# Patient Record
Sex: FEMALE | Race: WHITE | NOT HISPANIC OR LATINO | Employment: FULL TIME | URBAN - METROPOLITAN AREA
[De-identification: names, ages, dates, MRNs, and addresses within clinical notes are randomized per-mention and may not be internally consistent; named-entity substitution may affect disease eponyms.]

---

## 2017-07-24 RX ORDER — ESTRADIOL 1 MG/1
TABLET ORAL
Qty: 30 TABLET | Refills: 0 | Status: SHIPPED | OUTPATIENT
Start: 2017-07-24 | End: 2017-08-28 | Stop reason: SDUPTHER

## 2017-08-23 ENCOUNTER — TRANSCRIBE ORDERS (OUTPATIENT)
Dept: ADMINISTRATIVE | Facility: HOSPITAL | Age: 52
End: 2017-08-23

## 2017-08-23 DIAGNOSIS — Z12.31 VISIT FOR SCREENING MAMMOGRAM: Primary | ICD-10-CM

## 2017-08-25 ENCOUNTER — HOSPITAL ENCOUNTER (OUTPATIENT)
Dept: MAMMOGRAPHY | Facility: HOSPITAL | Age: 52
Discharge: HOME | End: 2017-08-25
Attending: OBSTETRICS & GYNECOLOGY | Admitting: OBSTETRICS & GYNECOLOGY

## 2017-08-25 DIAGNOSIS — Z12.31 VISIT FOR SCREENING MAMMOGRAM: ICD-10-CM

## 2017-08-25 PROCEDURE — 77067 SCR MAMMO BI INCL CAD: CPT | Performed by: RADIOLOGY

## 2017-08-25 PROCEDURE — G0202 SCR MAMMO BI INCL CAD: HCPCS

## 2017-08-25 PROCEDURE — 77063 BREAST TOMOSYNTHESIS BI: CPT

## 2017-08-25 PROCEDURE — 77063 BREAST TOMOSYNTHESIS BI: CPT | Performed by: RADIOLOGY

## 2017-08-28 RX ORDER — ESTRADIOL 1 MG/1
TABLET ORAL
Qty: 30 TABLET | Refills: 0 | Status: SHIPPED | OUTPATIENT
Start: 2017-08-28 | End: 2017-09-20 | Stop reason: SDUPTHER

## 2017-09-20 ENCOUNTER — OFFICE VISIT (OUTPATIENT)
Dept: OBSTETRICS AND GYNECOLOGY | Facility: CLINIC | Age: 52
End: 2017-09-20

## 2017-09-20 VITALS
SYSTOLIC BLOOD PRESSURE: 126 MMHG | HEIGHT: 60 IN | BODY MASS INDEX: 31.02 KG/M2 | RESPIRATION RATE: 14 BRPM | WEIGHT: 158 LBS | DIASTOLIC BLOOD PRESSURE: 76 MMHG

## 2017-09-20 DIAGNOSIS — Z01.419 WELL WOMAN EXAM WITH ROUTINE GYNECOLOGICAL EXAM: Primary | ICD-10-CM

## 2017-09-20 PROCEDURE — 99396 PREV VISIT EST AGE 40-64: CPT | Performed by: OBSTETRICS & GYNECOLOGY

## 2017-09-20 RX ORDER — ESTRADIOL 2 MG/1
2 TABLET ORAL DAILY
Qty: 30 TABLET | Refills: 12 | Status: SHIPPED | OUTPATIENT
Start: 2017-09-20 | End: 2018-09-22 | Stop reason: SDUPTHER

## 2017-09-20 RX ORDER — LISINOPRIL 10 MG/1
TABLET ORAL
COMMUNITY
Start: 2017-08-28 | End: 2021-01-12

## 2017-09-20 NOTE — PROGRESS NOTES
"Subjective   Chief Complaint   Patient presents with   • Gynecologic Exam     Gloria Zhou is a 52 y.o. year old  who is post-menopausal.  She is S/P hysterectomy presenting to be seen for her annual exam.  This past year she has been on hormone replacement therapy.  There has not been vaginal bleeding in the last 12 months.  Menopausal symptoms are present (night sweats) and are significantly affecting her quality of life.    SEXUAL Hx:  She is currently sexually active.  In the past year there has not been new sexual partners.    Condoms are never used.  She would not like to be screened for STD's at today's exam.  [place] is painful: no  HEALTH Hx:  She exercises regularly: no (and has no plans to become more active).  She wears her seat belt: yes.  She has concerns about domestic violence: no.  OTHER THINGS SHE WANTS TO DISCUSS TODAY:  Nothing else    The following portions of the patient's history were reviewed and updated as appropriate:problem list, current medications, allergies, past family history, past medical history, past social history and past surgical history.    Smoking status: Current Every Day Smoker                                                   Packs/day: 0.25      Years: 0.00         Types: Cigarettes     Start date: 1996  Smokeless status: Current User                      Review of Systems  Constitutional POS: nothing reported    NEG: anorexia or night sweats   Gastointestinal POS: nothing reported and had colonoscopy in the past 5 year - results are not in record for review    NEG: bloating, change in bowel habits, melena or reflux symptoms   Genitourinary POS: nothing reported    NEG: dysuria or hematuria   Integument POS: nothing reported    NEG: moles that are changing in size, shape, color or rashes   Breast POS: nothing reported    NEG: persistent breast lump, skin dimpling or nipple discharge        Objective   /76  Resp 14  Ht 60\" (152.4 cm)  Wt 158 lb " (71.7 kg)  LMP  (LMP Unknown)  Breastfeeding? No  BMI 30.86 kg/m2    General:  well developed; well nourished  no acute distress   Skin:  No suspicious lesions seen   Thyroid: normal to inspection and palpation   Breasts:  Examined in supine position  Symmetric without masses or skin dimpling  Nipples normal without inversion, lesions or discharge  There are no palpable axillary nodes  Bilateral implants are noted without obvious palpable abnormalities   Abdomen:c soft, non-tender; no masses  no umbilical or inginual hernias are present  no hepato-splenomegaly   Pelvis: Clinical staff was present for exam  External genitalia:  normal appearance of the external genitalia including Bartholin's and [place]'s glands.  :  urethral meatus normal;  Vaginal:  normal pink mucosa without prolapse or lesions.  Cervix:  absent.  Uterus:  absent.  Adnexa:  absent, bilateral.  Rectal:  digital rectal exam not performed; anus visually normal appearing.  Cystocele GRADE 0  Rectocele GRADE 0        Assessment   1. Normal GYN exam S/P hysterectomy w/ BSO  2. Menopausal female currently on HRT - with significant symptoms affecting activities of daily living.  Cannot exclude non-hormonal sources  3. She is up to date on all relevant gynecologic and colorectal screenings     Plan   1. Pap was not done today.  I explained to Gloria that the Pap smears are no longer recommended in patient's after hysterectomy.   I stressed to Gloria that she still should be seen to be seen yearly for a full physical including breast and pelvic exam.  2. She was encouraged to get yearly mammograms.  She should report any palpable breast lump(s) or skin changes regardless of mammographic findings.  I explained to Gloria that notification regarding her mammogram results will come from the center performing the study.  Our office will not be routinely calling with mammogram results.  It is her responsibility to make sure that the results from the mammogram  are communicated to her by the breast center.  If she has any questions about the results, she is welcome to call our office anytime.  3. The following data needs to be obtained to update her medical records: last colonoscopy.  4. Follow up for recheck of MP symptoms 6-8 weeks    New Medications Ordered This Visit   Medications   • estradiol (ESTRACE) 2 MG tablet     Sig: Take 1 tablet by mouth Daily.     Dispense:  30 tablet     Refill:  12          This note was electronically signed.    Franko Solis M.D.  September 20, 2017

## 2017-11-01 ENCOUNTER — TELEPHONE (OUTPATIENT)
Dept: OBSTETRICS AND GYNECOLOGY | Facility: CLINIC | Age: 52
End: 2017-11-01

## 2018-09-24 RX ORDER — ESTRADIOL 2 MG/1
TABLET ORAL
Qty: 30 TABLET | Refills: 1 | Status: SHIPPED | OUTPATIENT
Start: 2018-09-24 | End: 2018-10-19

## 2018-09-27 ENCOUNTER — TRANSCRIBE ORDERS (OUTPATIENT)
Dept: ADMINISTRATIVE | Facility: HOSPITAL | Age: 53
End: 2018-09-27

## 2018-09-27 DIAGNOSIS — Z12.31 VISIT FOR SCREENING MAMMOGRAM: Primary | ICD-10-CM

## 2018-10-19 ENCOUNTER — OFFICE VISIT (OUTPATIENT)
Dept: OBSTETRICS AND GYNECOLOGY | Facility: CLINIC | Age: 53
End: 2018-10-19

## 2018-10-19 VITALS — SYSTOLIC BLOOD PRESSURE: 112 MMHG | DIASTOLIC BLOOD PRESSURE: 78 MMHG | WEIGHT: 165 LBS | BODY MASS INDEX: 32.22 KG/M2

## 2018-10-19 DIAGNOSIS — N95.1 SYMPTOMATIC MENOPAUSAL OR FEMALE CLIMACTERIC STATES: ICD-10-CM

## 2018-10-19 DIAGNOSIS — Z01.419 WELL WOMAN EXAM: Primary | ICD-10-CM

## 2018-10-19 PROCEDURE — 99396 PREV VISIT EST AGE 40-64: CPT | Performed by: OBSTETRICS & GYNECOLOGY

## 2018-10-19 RX ORDER — ESTRADIOL 1 MG/1
1 TABLET ORAL DAILY
Qty: 30 TABLET | Refills: 12 | Status: SHIPPED | OUTPATIENT
Start: 2018-10-19 | End: 2019-11-19 | Stop reason: SDUPTHER

## 2018-10-19 NOTE — PROGRESS NOTES
Subjective   Chief Complaint   Patient presents with   • Gynecologic Exam     annual exam, denies c/o's     Gloria Zhou is a 53 y.o. year old  menopausal female presenting to be seen for her annual exam.  This past year she has been on hormone replacement therapy.  There has not been vaginal bleeding in the last 12 months.  Menopausal symptoms night sweats are present. Denies hot flashes or mood changes.     SEXUAL Hx:  She is currently sexually active. .  In the past year there there has been NO new sexual partners.    Condoms are never used.  She would not like to be screened for STD's at today's exam.  [place] is painful: no  HEALTH Hx:  She exercises regularly: starting back now.  She wears her seat belt: yes.  She has concerns about domestic violence: no.  She has noticed changes in height: no.  OTHER THINGS SHE WANTS TO DISCUSS TODAY:  Nothing else    The following portions of the patient's history were reviewed and updated as appropriate:problem list, current medications, allergies, past family history, past medical history, past social history and past surgical history.    Smoking status: Current Every Day Smoker                                                   Packs/day: 0.25      Years: 0.00         Types: Cigarettes     Start date: 1996  Smokeless tobacco: Current User                    Comment: 4 cig/day      Review of Systems  Constitutional POS: nothing reported    NEG: anorexia or night sweats   Genitourinary POS: WAT[place] is present but it IS NOT effecting her ADL's    NEG: dysuria or hematuria      Gastointestinal POS: nothing reported    NEG: bloating, change in bowel habits, melena or reflux symptoms   Integument POS: nothing reported    NEG: moles that are changing in size, shape, color or rashes   Breast POS: nothing reported    NEG: persistent breast lump, skin dimpling or nipple discharge        Objective   /78   Wt 74.8 kg (165 lb)   LMP  (LMP Unknown)   BMI  32.22 kg/m²     General:  well developed; well nourished  no acute distress   Skin:  No suspicious lesions seen   Thyroid: not examined   Breasts:  Examined in supine position  Symmetric without masses or skin dimpling  Nipples normal without inversion, lesions or discharge  There are no palpable axillary nodes  Bilateral implants are noted without obvious palpable abnormalities   Abdomen: soft, non-tender; no masses  no umbilical or inginual hernias are present  no hepato-splenomegaly   Pelvis: Clinical staff was present for exam  External genitalia:  normal appearance of the external genitalia including Bartholin's and [place]'s glands.  :  urethral meatus normal;  Vaginal:  normal pink mucosa without prolapse or lesions.  Cervix:  normal appearance.  Uterus:  normal size, shape and consistency.  Adnexa:  normal bimanual exam of the adnexa.  Rectal:  digital rectal exam not performed; anus visually normal appearing.        Assessment   1. Normal GYN exam S/P hysterectomy w/ BSO  2. She is up to date on all relevant gynecologic and colorectal screenings     Plan   1. Pap was not done today.  I explained to Gloria that the Pap smears are no longer recommended in patient's after hysterectomy.   I stressed to Gloria that she still should be seen to be seen yearly for a full physical including breast and pelvic exam.  2. She was encouraged to get yearly mammograms.  She should report any palpable breast lump(s) or skin changes regardless of mammographic findings.  I explained to Gloria that notification regarding her mammogram results will come from the center performing the study.  Our office will not be routinely calling with mammogram results.  It is her responsibility to make sure that the results from the mammogram are communicated to her by the breast center.  If she has any questions about the results, she is welcome to call our office anytime.  3. The following data needs to be obtained to update her medical  records: last colonoscopy.  4. She was recommended to begin bone density scans (DEXA) at age 60-65 for osteoporosis screening.  5. Data from the women's health initiative study was reviewed.  With Premarin versus placebo, it was explained that there was no significant difference in the rates of stroke, heart attack or breast cancer until greater than 5 years of use.  The magnitude of risk after 5 years of use was approximately 7 additional cases of stroke per 10,000 women years of use.  Furthermore, data is only available for Premarin.  Any extrapolation to other forms of hormone therapy cannot accurately say whether the risks are equal, less for more than with the medications studied.  Ultimately, if she wishes to use hormone replacement therapy, the goal would be to try to reduce it to the lowest possible dose.  Preferably, the goal would be total withdrawal of systemic hormone therapy by 5 years.  There is no good prospective data to quantify the risk for use of ERT for greater than 6 years. She will try decreasing to estrace 1mg tablet from 2mg  6. The importance of keeping all planned follow-up and taking all medications as prescribed was emphasized.  7. Follow up for annual exam in 1 year year    New Medications Ordered This Visit   Medications   • estradiol (ESTRACE) 1 MG tablet     Sig: Take 1 tablet by mouth Daily.     Dispense:  30 tablet     Refill:  12          This note was electronically signed.    Krystin Le MD  October 19, 2018    Note: Speech recognition transcription software may have been used to create portions of this document.  An attempt at proofreading has been made but errors in transcription could still be present.

## 2018-11-08 ENCOUNTER — HOSPITAL ENCOUNTER (OUTPATIENT)
Dept: MAMMOGRAPHY | Facility: HOSPITAL | Age: 53
Discharge: HOME | End: 2018-11-08
Attending: OBSTETRICS & GYNECOLOGY | Admitting: OBSTETRICS & GYNECOLOGY

## 2018-11-08 DIAGNOSIS — Z12.31 VISIT FOR SCREENING MAMMOGRAM: ICD-10-CM

## 2018-11-08 PROCEDURE — 77063 BREAST TOMOSYNTHESIS BI: CPT | Performed by: RADIOLOGY

## 2018-11-08 PROCEDURE — 77067 SCR MAMMO BI INCL CAD: CPT | Performed by: RADIOLOGY

## 2018-11-08 PROCEDURE — 77063 BREAST TOMOSYNTHESIS BI: CPT

## 2018-11-08 PROCEDURE — 77067 SCR MAMMO BI INCL CAD: CPT

## 2019-11-19 RX ORDER — ESTRADIOL 1 MG/1
1 TABLET ORAL DAILY
Qty: 30 TABLET | Refills: 1 | Status: SHIPPED | OUTPATIENT
Start: 2019-11-19 | End: 2020-01-07 | Stop reason: SDUPTHER

## 2020-01-07 ENCOUNTER — OFFICE VISIT (OUTPATIENT)
Dept: OBSTETRICS AND GYNECOLOGY | Facility: CLINIC | Age: 55
End: 2020-01-07

## 2020-01-07 VITALS
HEIGHT: 60 IN | BODY MASS INDEX: 32.63 KG/M2 | SYSTOLIC BLOOD PRESSURE: 116 MMHG | WEIGHT: 166.2 LBS | DIASTOLIC BLOOD PRESSURE: 82 MMHG

## 2020-01-07 DIAGNOSIS — Z79.890 HORMONE REPLACEMENT THERAPY (HRT): ICD-10-CM

## 2020-01-07 DIAGNOSIS — Z01.419 WELL WOMAN EXAM: Primary | ICD-10-CM

## 2020-01-07 PROCEDURE — 99396 PREV VISIT EST AGE 40-64: CPT | Performed by: OBSTETRICS & GYNECOLOGY

## 2020-01-07 RX ORDER — LISINOPRIL 20 MG/1
TABLET ORAL
COMMUNITY
Start: 2019-11-04 | End: 2022-01-25

## 2020-01-07 RX ORDER — PREDNISONE 20 MG/1
TABLET ORAL
COMMUNITY
Start: 2019-12-16 | End: 2020-01-07

## 2020-01-07 RX ORDER — ESTRADIOL 1 MG/1
1 TABLET ORAL DAILY
Qty: 30 TABLET | Refills: 9 | Status: SHIPPED | OUTPATIENT
Start: 2020-01-07 | End: 2020-10-30

## 2020-01-07 NOTE — PROGRESS NOTES
"Subjective   Chief Complaint   Patient presents with   • Gynecologic Exam     pt here for annual. mammo 2018 negative.     Gloria Zhou is a 54 y.o. year old  menopausal female presenting to be seen for her annual exam.  This past year she has not been on hormone replacement therapy.  There has not been vaginal bleeding in the last 12 months.  Menopausal symptoms are not present.    SEXUAL Hx:  She is currently sexually active.  In the past year there there has been NO new sexual partners.    Condoms are never used.  She would not like to be screened for STD's at today's exam.  [place] is painful: no  HEALTH Hx:  She exercises regularly: no (but is planning to start exercising more ).  She wears her seat belt: yes.  She has concerns about domestic violence: no.  She has noticed changes in height: no.  OTHER THINGS SHE WANTS TO DISCUSS TODAY:  Nothing else    The following portions of the patient's history were reviewed and updated as appropriate:problem list, current medications, allergies, past family history, past medical history, past social history and past surgical history.    Social History    Tobacco Use      Smoking status: Current Every Day Smoker        Packs/day: 0.25        Types: Cigarettes        Start date: 1996      Smokeless tobacco: Current User      Tobacco comment: 4 cig/day      Review of Systems  Constitutional POS: nothing reported    NEG: anorexia or night sweats   Genitourinary POS: WAT[place] is present but it IS NOT effecting her ADL's    NEG: dysuria or hematuria      Gastointestinal POS: nothing reported    NEG: bloating, change in bowel habits, melena or reflux symptoms   Integument POS: nothing reported    NEG: moles that are changing in size, shape, color or rashes   Breast POS: nothing reported    NEG: persistent breast lump, skin dimpling or nipple discharge        Objective   /82   Ht 152.4 cm (60\")   Wt 75.4 kg (166 lb 3.2 oz)   LMP  (LMP Unknown)   " Breastfeeding No   BMI 32.46 kg/m²     General:  well developed; well nourished  no acute distress   Skin:  No suspicious lesions seen   Thyroid: normal to inspection and palpation   Breasts:  Examined in supine position  Symmetric without masses or skin dimpling  Nipples normal without inversion, lesions or discharge  There are no palpable axillary nodes  Bilateral implants are noted without obvious palpable abnormalities   Abdomen: soft, non-tender; no masses  no umbilical or inguinal hernias are present  no hepato-splenomegaly   Pelvis: Clinical staff was present for exam  External genitalia:  normal appearance of the external genitalia including Bartholin's and [place]'s glands.  :  urethral meatus normal;  Vaginal:  normal pink mucosa without prolapse or lesions.  Cervix:  absent.  Uterus:  absent.  Adnexa:  absent, bilateral.  Rectal:  digital rectal exam not performed; anus visually normal appearing.        Assessment   1. Normal GYN exam S/P hysterectomy w/ BSO  2. She is up to date on all relevant gynecologic and colorectal screenings except mammography   3. Post menopausal, smoker FRAX 4.3%     Plan   Pap was not done today.  I explained to Gloria that the Pap smears are no longer recommended in patient's after hysterectomy.   I stressed to Gloria that she still should be seen to be seen yearly for a full physical including breast and pelvic exam.  She was encouraged to get yearly mammograms.  She should report any palpable breast lump(s) or skin changes regardless of mammographic findings.  I explained to Gloria that notification regarding her mammogram results will come from the center performing the study.  Our office will not be routinely calling with mammogram results.  It is her responsibility to make sure that the results from the mammogram are communicated to her by the breast center.  If she has any questions about the results, she is welcome to call our office anytime.  Data from the women's health  initiative study was reviewed.  With Premarin versus placebo, it was explained that there was no significant difference in the rates of stroke, heart attack or breast cancer until greater than 5 years of use.  The magnitude of risk after 5 years of use was approximately 7 additional cases of stroke per 10,000 women years of use.  Furthermore, data is only available for Premarin.  Any extrapolation to other forms of hormone therapy cannot accurately say whether the risks are equal, less for more than with the medications studied.  Ultimately, if she wishes to use hormone replacement therapy, the goal would be to try to reduce it to the lowest possible dose.  Preferably, the goal would be total withdrawal of systemic hormone therapy by 5 years.  There is no good prospective data to quantify the risk for use of ERT for greater than 6 years. She will consider tapering off this year given diagnosis of HTN and smoker   Reviewed will continue to evaluate need for DEXA with FRAX score calculation   1. The importance of keeping all planned follow-up and taking all medications as prescribed was emphasized.  2. Follow up for annual exam in one year    New Medications Ordered This Visit   Medications   • estradiol (ESTRACE) 1 MG tablet     Sig: Take 1 tablet by mouth Daily.     Dispense:  30 tablet     Refill:  9          This note was electronically signed.    Krystin Le MD  January 7, 2020    Note: Speech recognition transcription software may have been used to create portions of this document.  An attempt at proofreading has been made but errors in transcription could still be present.

## 2020-01-07 NOTE — PATIENT INSTRUCTIONS
Your FRAX score does not indicate that you need a bone density scan at this time. We will continue to re-evaluate each year.

## 2020-10-30 RX ORDER — ESTRADIOL 1 MG/1
1 TABLET ORAL DAILY
Qty: 30 TABLET | Refills: 12 | Status: SHIPPED | OUTPATIENT
Start: 2020-10-30 | End: 2023-03-07

## 2021-01-12 ENCOUNTER — OFFICE VISIT (OUTPATIENT)
Dept: OBSTETRICS AND GYNECOLOGY | Facility: CLINIC | Age: 56
End: 2021-01-12

## 2021-01-12 VITALS
BODY MASS INDEX: 33.26 KG/M2 | WEIGHT: 165 LBS | SYSTOLIC BLOOD PRESSURE: 124 MMHG | DIASTOLIC BLOOD PRESSURE: 84 MMHG | HEIGHT: 59 IN

## 2021-01-12 DIAGNOSIS — Z79.890 HORMONE REPLACEMENT THERAPY (HRT): ICD-10-CM

## 2021-01-12 DIAGNOSIS — Z12.31 ENCOUNTER FOR SCREENING MAMMOGRAM FOR MALIGNANT NEOPLASM OF BREAST: ICD-10-CM

## 2021-01-12 DIAGNOSIS — Z01.419 WELL WOMAN EXAM WITH ROUTINE GYNECOLOGICAL EXAM: Primary | ICD-10-CM

## 2021-01-12 PROCEDURE — 99396 PREV VISIT EST AGE 40-64: CPT | Performed by: OBSTETRICS & GYNECOLOGY

## 2021-01-12 RX ORDER — BUDESONIDE AND FORMOTEROL FUMARATE DIHYDRATE 80; 4.5 UG/1; UG/1
AEROSOL RESPIRATORY (INHALATION)
COMMUNITY
Start: 2021-01-07

## 2021-01-12 RX ORDER — ALBUTEROL SULFATE 90 UG/1
AEROSOL, METERED RESPIRATORY (INHALATION)
COMMUNITY
Start: 2021-01-07

## 2021-01-12 NOTE — PROGRESS NOTES
"Subjective   Chief Complaint   Patient presents with   • Gynecologic Exam     annual. last mammo 18 negative. no c/o     Gloria Zhou is a 55 y.o. year old  menopausal female presenting to be seen for her annual exam.  This past year she has been on hormone replacement therapy.  There has not been vaginal bleeding in the last 12 months.  Menopausal symptoms are not present.    SEXUAL Hx:  She is currently sexually active.  In the past year there there has been NO new sexual partners.    Condoms are never used.  She would not like to be screened for STD's at today's exam.  [place] is painful: no  HEALTH Hx:  She exercises regularly: no (but is planning to start exercising more ).  She wears her seat belt: yes.  She has concerns about domestic violence: no.  She has noticed changes in height: no.  OTHER THINGS SHE WANTS TO DISCUSS TODAY:  Nothing else    The following portions of the patient's history were reviewed and updated as appropriate:problem list, current medications, allergies, past family history, past medical history, past social history and past surgical history.    Social History    Tobacco Use      Smoking status: Current Every Day Smoker        Packs/day: 0.25        Types: Cigarettes        Start date: 1996      Smokeless tobacco: Current User      Tobacco comment: 4 cig/day      Review of Systems  Constitutional POS: nothing reported    NEG: anorexia or night sweats   Genitourinary POS: WATSON not affecting ADLs    NEG: dysuria or hematuria      Gastointestinal POS: nothing reported    NEG: bloating, change in bowel habits, melena or reflux symptoms   Integument POS: nothing reported    NEG: moles that are changing in size, shape, color or rashes   Breast POS: nothing reported    NEG: persistent breast lump, skin dimpling or nipple discharge        Objective   /84   Ht 149.2 cm (58.75\")   Wt 74.8 kg (165 lb)   LMP  (LMP Unknown)   Breastfeeding No   BMI 33.61 kg/m² "     General:  well developed; well nourished  no acute distress   Skin:  No suspicious lesions seen   Thyroid: normal to inspection and palpation   Breasts:  Examined in supine position  Symmetric without masses or skin dimpling  Nipples normal without inversion, lesions or discharge  There are no palpable axillary nodes   Abdomen: soft, non-tender; no masses  no umbilical or inguinal hernias are present  no hepato-splenomegaly   Pelvis: Clinical staff was present for exam  External genitalia:  normal appearance of the external genitalia including Bartholin's and [place]'s glands.  :  urethral meatus normal;  Vaginal:  atrophic mucosal changes are present;  Cervix:  absent.  Uterus:  absent.  Adnexa:  absent, bilateral.  Rectal:  digital rectal exam not performed; anus visually normal appearing.        Assessment   1. Normal GYN exam S/P hysterectomy w/ BSO  2. She is up to date on all relevant gynecologic and colorectal screenings except mammography     Plan   Pap was not done today.  I explained to Gloria that the Pap smears are no longer recommended in patient's after hysterectomy.   I stressed to Gloria that she still should be seen to be seen yearly for a full physical including breast and pelvic exam.  She was encouraged to get yearly mammograms.  She should report any palpable breast lump(s) or skin changes regardless of mammographic findings.  I explained to Gloria that notification regarding her mammogram results will come from the center performing the study.  Our office will not be routinely calling with mammogram results.  It is her responsibility to make sure that the results from the mammogram are communicated to her by the breast center.  If she has any questions about the results, she is welcome to call our office anytime.  Colonoscopy UTD, DEXA age 60-65  Data from the women's health initiative study was reviewed.  With Premarin versus placebo, it was explained that there was no significant difference  in the rates of stroke, heart attack or breast cancer until greater than 5 years of use.  The magnitude of risk after 5 years of use was approximately 7 additional cases of stroke per 10,000 women years of use.  Furthermore, data is only available for Premarin.  Any extrapolation to other forms of hormone therapy cannot accurately say whether the risks are equal, less for more than with the medications studied.  Ultimately, if she wishes to use hormone replacement therapy, the goal would be to try to reduce it to the lowest possible dose.  Preferably, the goal would be total withdrawal of systemic hormone therapy by 5 years.  There is no good prospective data to quantify the risk for use of ERT for greater than 6 years. She will try to taper this year off.   The importance of keeping all planned follow-up and taking all medications as prescribed was emphasized.  Follow up for annual exam in one year    No orders of the defined types were placed in this encounter.         This note was electronically signed.    Krystin Le MD  January 12, 2021    Note: Speech recognition transcription software may have been used to create portions of this document.  An attempt at proofreading has been made but errors in transcription could still be present.

## 2021-03-23 ENCOUNTER — HOSPITAL ENCOUNTER (OUTPATIENT)
Dept: MAMMOGRAPHY | Facility: HOSPITAL | Age: 56
Discharge: HOME | End: 2021-03-23
Admitting: OBSTETRICS & GYNECOLOGY

## 2021-03-23 DIAGNOSIS — Z12.31 ENCOUNTER FOR SCREENING MAMMOGRAM FOR MALIGNANT NEOPLASM OF BREAST: ICD-10-CM

## 2021-03-23 PROCEDURE — 77067 SCR MAMMO BI INCL CAD: CPT | Performed by: RADIOLOGY

## 2021-03-23 PROCEDURE — 77063 BREAST TOMOSYNTHESIS BI: CPT

## 2021-03-23 PROCEDURE — 77063 BREAST TOMOSYNTHESIS BI: CPT | Performed by: RADIOLOGY

## 2021-03-23 PROCEDURE — 77067 SCR MAMMO BI INCL CAD: CPT

## 2022-01-25 ENCOUNTER — TELEPHONE (OUTPATIENT)
Dept: OBSTETRICS AND GYNECOLOGY | Facility: CLINIC | Age: 57
End: 2022-01-25

## 2022-01-25 ENCOUNTER — OFFICE VISIT (OUTPATIENT)
Dept: OBSTETRICS AND GYNECOLOGY | Facility: CLINIC | Age: 57
End: 2022-01-25

## 2022-01-25 VITALS
WEIGHT: 159.4 LBS | HEIGHT: 59 IN | DIASTOLIC BLOOD PRESSURE: 68 MMHG | BODY MASS INDEX: 32.13 KG/M2 | SYSTOLIC BLOOD PRESSURE: 102 MMHG

## 2022-01-25 DIAGNOSIS — N64.59 INVERTED NIPPLE: ICD-10-CM

## 2022-01-25 DIAGNOSIS — Z12.11 SCREENING FOR COLON CANCER: ICD-10-CM

## 2022-01-25 DIAGNOSIS — Z01.411 ENCOUNTER FOR GYNECOLOGICAL EXAMINATION WITH ABNORMAL FINDING: Primary | ICD-10-CM

## 2022-01-25 PROCEDURE — 99396 PREV VISIT EST AGE 40-64: CPT | Performed by: NURSE PRACTITIONER

## 2022-01-25 RX ORDER — ATORVASTATIN CALCIUM 20 MG/1
20 TABLET, FILM COATED ORAL
COMMUNITY
Start: 2021-12-29

## 2022-01-25 RX ORDER — VALACYCLOVIR HYDROCHLORIDE 500 MG/1
1 TABLET, FILM COATED ORAL AS NEEDED
COMMUNITY
End: 2023-03-07

## 2022-01-25 RX ORDER — LEVOTHYROXINE SODIUM 88 UG/1
88 TABLET ORAL DAILY
COMMUNITY
Start: 2021-12-20

## 2022-01-25 RX ORDER — LISINOPRIL AND HYDROCHLOROTHIAZIDE 25; 20 MG/1; MG/1
1 TABLET ORAL EVERY MORNING
COMMUNITY
Start: 2021-12-30

## 2022-01-25 RX ORDER — CITALOPRAM 20 MG/1
1 TABLET ORAL DAILY
COMMUNITY
Start: 2022-01-24

## 2022-01-25 RX ORDER — MELOXICAM 15 MG/1
1 TABLET ORAL DAILY
COMMUNITY
Start: 2022-01-24

## 2022-01-25 NOTE — PROGRESS NOTES
Chief Complaint  Gloria Zhou is a 56 y.o.  female presenting for Annual Exam and Med Refill (discuss continuation of estradiol 1 mg.  patient is only taking 2 X week, Sundays and .)    History of Present Illness  Very pleasant 57yo woman, pt of Dr. Le & Dr. Solis, here for annual gyn exam.  She has given a lot of thought to ERT (has been on it x > 15 yrs) and has already begun to wean off it.  (See above comment in CC.)  We had lengthy discussion re: benefits vs risks.  She doesn't want to wean further on oral or switch to transdermals . . . She just wishes to DC the med now.  Due colonoscopy & wishes to do this in [place] with Dr. Saleh.  Other preventive health procedures up to date.  All other ROS neg.  States the left nipple always inverts when erect (this has been true x years;  Not a new finding)    The following portions of the patient's history were reviewed and updated as appropriate: allergies, current medications, past family history, past medical history, past social history, past surgical history and problem list.    No Known Allergies      Current Outpatient Medications:   •  albuterol sulfate  (90 Base) MCG/ACT inhaler, , Disp: , Rfl:   •  atorvastatin (LIPITOR) 20 MG tablet, Take 20 mg by mouth every night at bedtime., Disp: , Rfl:   •  budesonide-formoterol (SYMBICORT) 80-4.5 MCG/ACT inhaler, , Disp: , Rfl:   •  citalopram (CeleXA) 20 MG tablet, Take 1 tablet by mouth Daily., Disp: , Rfl:   •  estradiol (ESTRACE) 1 MG tablet, TAKE 1 TABLET BY MOUTH DAILY., Disp: 30 tablet, Rfl: 12  •  levothyroxine (SYNTHROID, LEVOTHROID) 88 MCG tablet, Take 88 mcg by mouth Daily., Disp: , Rfl:   •  lisinopril-hydrochlorothiazide (PRINZIDE,ZESTORETIC) 20-25 MG per tablet, Take 1 tablet by mouth Every Morning., Disp: , Rfl:   •  MELATONIN PO, Take  by mouth., Disp: , Rfl:   •  meloxicam (MOBIC) 15 MG tablet, Take 1 tablet by mouth Daily., Disp: , Rfl:   •  Multiple Vitamin  "(MULTI VITAMIN DAILY PO), Take by mouth, Disp: , Rfl:   •  valACYclovir (VALTREX) 500 MG tablet, Take 1 tablet by mouth As Needed., Disp: , Rfl:     Past Medical History:   Diagnosis Date   • Asthma    • Depression 2002   • HTN 2016   • Hyperlipidemia    • Hypothyroid 1988   • Post-menopause on HRT (hormone replacement therapy)         Past Surgical History:   Procedure Laterality Date   • AUGMENTATION MAMMAPLASTY Bilateral 1998   • BREAST AUGMENTATION Bilateral 1998   • BREAST AUGMENTATION Bilateral 1999   • LAPAROSCOPIC ASSISTED VAGINAL HYSTERECTOMY SALPINGO OOPHORECTOMY Left 2003    [place], History of AUB and ovarian cysts   • LAPAROSCOPIC SALPINGOOPHERECTOMY Right 02/2004    [place]   • TUBAL ABDOMINAL LIGATION  1987   • UVULECTOMY  04/2007       Objective  /68   Ht 149.2 cm (58.75\")   Wt 72.3 kg (159 lb 6.4 oz)   LMP  (LMP Unknown)   Breastfeeding No   BMI 32.47 kg/m²     Physical Exam  Exam conducted with a chaperone present.   Constitutional:       Appearance: Normal appearance.   HENT:      Head: Normocephalic.   Neck:      Thyroid: No thyroid mass or thyromegaly.   Cardiovascular:      Rate and Rhythm: Normal rate and regular rhythm.      Heart sounds: Normal heart sounds.   Pulmonary:      Effort: Pulmonary effort is normal.      Breath sounds: Normal breath sounds.   Chest:   Breasts:      Right: No inverted nipple, mass, nipple discharge, axillary adenopathy or supraclavicular adenopathy.      Left: Inverted nipple present. No mass, nipple discharge, axillary adenopathy or supraclavicular adenopathy.       Abdominal:      Palpations: Abdomen is soft. There is no mass.      Tenderness: There is no abdominal tenderness.   Genitourinary:     General: Normal vulva.      Labia:         Right: No lesion.         Left: No lesion.       Vagina: Normal. No erythema.      Uterus: Absent.       Adnexa:         Right: No mass or tenderness.          Left: No mass or tenderness.        Comments: Anus " appears wnl.  No rectal exam performed.  Lymphadenopathy:      Upper Body:      Right upper body: No supraclavicular or axillary adenopathy.      Left upper body: No supraclavicular or axillary adenopathy.   Neurological:      Mental Status: She is alert.         Assessment/Plan   Diagnoses and all orders for this visit:    1. Encounter for gynecological examination with abnormal finding (Primary)    2. Screening for colon cancer  -     Ambulatory Referral For Screening Colonoscopy    3. Inverted nipple    LEFT breast;  Not a new finding.  Couns re: SBE and preventive health procedures.  Procedures        Return in about 1 year (around 1/25/2023) for Annual physical.    Sheila Phelan, APRN  01/25/2022

## 2023-01-26 ENCOUNTER — TELEPHONE (OUTPATIENT)
Dept: OBSTETRICS AND GYNECOLOGY | Facility: CLINIC | Age: 58
End: 2023-01-26

## 2023-01-26 NOTE — TELEPHONE ENCOUNTER
Caller: Gloria Zhou    Relationship to patient: Self    Best call back number: [phone]    Type of visit: ANNUAL    If rescheduling, when is the original appointment: 1/26/23    Additional notes:PT R/S TODAYS APPT DUE TO NOT FEELING WELL, SHE DID R/S FOR 2/15/23

## 2023-03-07 ENCOUNTER — TRANSCRIBE ORDERS (OUTPATIENT)
Dept: ADMINISTRATIVE | Facility: HOSPITAL | Age: 58
End: 2023-03-07
Payer: COMMERCIAL

## 2023-03-07 ENCOUNTER — OFFICE VISIT (OUTPATIENT)
Dept: OBSTETRICS AND GYNECOLOGY | Facility: CLINIC | Age: 58
End: 2023-03-07
Payer: COMMERCIAL

## 2023-03-07 VITALS
WEIGHT: 153.8 LBS | HEIGHT: 59 IN | SYSTOLIC BLOOD PRESSURE: 130 MMHG | BODY MASS INDEX: 31 KG/M2 | DIASTOLIC BLOOD PRESSURE: 70 MMHG

## 2023-03-07 DIAGNOSIS — Z01.419 ENCOUNTER FOR GYNECOLOGICAL EXAMINATION WITHOUT ABNORMAL FINDING: Primary | ICD-10-CM

## 2023-03-07 DIAGNOSIS — Z12.39 ENCOUNTER FOR SCREENING FOR MALIGNANT NEOPLASM OF BREAST, UNSPECIFIED SCREENING MODALITY: ICD-10-CM

## 2023-03-07 DIAGNOSIS — Z12.31 VISIT FOR SCREENING MAMMOGRAM: Primary | ICD-10-CM

## 2023-03-07 DIAGNOSIS — Z12.11 SCREENING FOR COLON CANCER: ICD-10-CM

## 2023-03-07 PROCEDURE — 99396 PREV VISIT EST AGE 40-64: CPT | Performed by: NURSE PRACTITIONER

## 2023-03-07 RX ORDER — VALACYCLOVIR HYDROCHLORIDE 1 G/1
TABLET, FILM COATED ORAL
COMMUNITY
Start: 2023-03-02

## 2023-03-07 RX ORDER — MELATONIN
1000 DAILY
COMMUNITY

## 2023-03-07 NOTE — PROGRESS NOTES
Chief Complaint  Gloria Zhou is a 57 y.o.  female presenting for Annual Exam    History of Present Illness  Gloria is a very pleasant 56yo woman, , here for annual gyn exam.  She is doing very well without any systemic HRT in the past year.  Not having many daytime flushing episodes; she has just occasional night sweats.  No vaginitis sx or UTI.  Asthma well controlled, moods are good, HTN well controlled, hypothyroidism compensated.  She still smokes, but only ~ 2-3 cig/day.  Otherwise, ROS negative.    She will call right away to get mammogram set up.  (Last mammo 3/2021)  We will put in another order for colonoscopy.  (Last colonoscopy 2016)      The following portions of the patient's history were reviewed and updated as appropriate: allergies, current medications, past family history, past medical history, past social history, past surgical history and problem list.    No Known Allergies      Current Outpatient Medications:   •  albuterol sulfate  (90 Base) MCG/ACT inhaler, , Disp: , Rfl:   •  atorvastatin (LIPITOR) 20 MG tablet, Take 20 mg by mouth every night at bedtime., Disp: , Rfl:   •  budesonide-formoterol (SYMBICORT) 80-4.5 MCG/ACT inhaler, , Disp: , Rfl:   •  Calcium-Magnesium-Vitamin D (CALCIUM 1200+D3 PO), Take 1 tablet by mouth Daily., Disp: , Rfl:   •  Cholecalciferol 25 MCG (1000 UT) tablet, Take 1 tablet by mouth Daily., Disp: , Rfl:   •  citalopram (CeleXA) 20 MG tablet, Take 1 tablet by mouth Daily., Disp: , Rfl:   •  levothyroxine (SYNTHROID, LEVOTHROID) 88 MCG tablet, Take 88 mcg by mouth Daily., Disp: , Rfl:   •  lisinopril-hydrochlorothiazide (PRINZIDE,ZESTORETIC) 20-25 MG per tablet, Take 1 tablet by mouth Every Morning., Disp: , Rfl:   •  meloxicam (MOBIC) 15 MG tablet, Take 1 tablet by mouth Daily., Disp: , Rfl:   •  valACYclovir (VALTREX) 1000 MG tablet, TAKE ONE TABLET TWO TIMES A DAY AS NEEDED FOR FEVER BLISTER, Disp: , Rfl:     Past Medical History:   Diagnosis  "Date   • Asthma    • Depression 2002   • HTN 2016   • Hyperlipidemia    • Hypothyroid 1988   • Post-menopause on HRT (hormone replacement therapy)         Past Surgical History:   Procedure Laterality Date   • AUGMENTATION MAMMAPLASTY Bilateral 1998   • BREAST AUGMENTATION Bilateral 1998   • BREAST AUGMENTATION Bilateral 1999   • LAPAROSCOPIC ASSISTED VAGINAL HYSTERECTOMY SALPINGO OOPHORECTOMY Left 2003    [place], History of AUB and ovarian cysts   • LAPAROSCOPIC SALPINGOOPHERECTOMY Right 02/2004    [place]   • TUBAL ABDOMINAL LIGATION  1987   • UVULECTOMY  04/2007       Objective  /70   Ht 149.2 cm (58.75\")   Wt 69.8 kg (153 lb 12.8 oz)   LMP  (LMP Unknown)   Breastfeeding No   BMI 31.33 kg/m²     Physical Exam  Vitals and nursing note reviewed. Exam conducted with a chaperone present.   Constitutional:       General: She is not in acute distress.     Appearance: Normal appearance. She is not ill-appearing.   HENT:      Head: Normocephalic.   Neck:      Thyroid: No thyroid mass or thyromegaly.   Cardiovascular:      Rate and Rhythm: Normal rate and regular rhythm.      Heart sounds: Normal heart sounds. No murmur heard.  Pulmonary:      Effort: Pulmonary effort is normal. No respiratory distress.      Breath sounds: Normal breath sounds.   Chest:   Breasts:     Right: No inverted nipple, mass or nipple discharge.      Left: No inverted nipple, mass or nipple discharge.   Abdominal:      Palpations: Abdomen is soft. There is no mass.      Tenderness: There is no abdominal tenderness.   Genitourinary:     General: Normal vulva.      Labia:         Right: No rash, tenderness or lesion.         Left: No rash, tenderness or lesion.       Vagina: No vaginal discharge or erythema.      Uterus: Absent.       Adnexa:         Right: No mass or tenderness.          Left: No mass or tenderness.        Comments: Anus appears wnl.  No rectal exam performed.  Lymphadenopathy:      Upper Body:      Right upper body: No " supraclavicular or axillary adenopathy.      Left upper body: No supraclavicular or axillary adenopathy.   Skin:     General: Skin is warm and dry.   Neurological:      Mental Status: She is alert and oriented to person, place, and time.   Psychiatric:         Mood and Affect: Mood normal.         Behavior: Behavior normal.         Assessment/Plan   Diagnoses and all orders for this visit:    1. Encounter for gynecological examination without abnormal finding (Primary)    2. Encounter for screening for malignant neoplasm of breast, unspecified screening modality    3. Screening for colon cancer  -     Ambulatory Referral For Screening Colonoscopy    She was given the phone # to call for setting up screening mammogram.    Couns re: calcium, vitamin D intake, walking & weight bearing exercise for BMD.  (Food lists were given for both calcium rich foods & vitamin D rich foods.)  Encouraged smoking cessation.     Procedures    40 to 64: Counseling/Anticipatory Guidance Discussed: nutrition, physical activity, misuse of tobacco, screenings and self-breast exam    Return in about 1 year (around 3/7/2024) for Annual physical.    Sheila Phelan, [place]  03/07/2023

## 2023-05-03 ENCOUNTER — HOSPITAL ENCOUNTER (OUTPATIENT)
Dept: MAMMOGRAPHY | Facility: HOSPITAL | Age: 58
Discharge: HOME | End: 2023-05-03
Admitting: NURSE PRACTITIONER
Payer: COMMERCIAL

## 2023-05-03 DIAGNOSIS — Z12.31 VISIT FOR SCREENING MAMMOGRAM: ICD-10-CM

## 2023-05-03 PROCEDURE — 77063 BREAST TOMOSYNTHESIS BI: CPT

## 2023-05-03 PROCEDURE — 77067 SCR MAMMO BI INCL CAD: CPT

## 2023-06-15 ENCOUNTER — HOSPITAL ENCOUNTER (OUTPATIENT)
Dept: MAMMOGRAPHY | Facility: HOSPITAL | Age: 58
Discharge: HOME | End: 2023-06-15
Admitting: RADIOLOGY
Payer: COMMERCIAL

## 2023-06-15 DIAGNOSIS — R92.8 ABNORMAL MAMMOGRAM: ICD-10-CM

## 2023-06-15 PROCEDURE — 77066 DX MAMMO INCL CAD BI: CPT

## 2023-06-15 PROCEDURE — G0279 TOMOSYNTHESIS, MAMMO: HCPCS

## 2023-07-31 ENCOUNTER — OUTSIDE FACILITY SERVICE (OUTPATIENT)
Dept: GASTROENTEROLOGY | Facility: CLINIC | Age: 58
End: 2023-07-31
Payer: COMMERCIAL

## 2023-07-31 PROCEDURE — 45385 COLONOSCOPY W/LESION REMOVAL: CPT | Performed by: INTERNAL MEDICINE

## 2023-07-31 PROCEDURE — 45388 COLONOSCOPY W/ABLATION: CPT | Performed by: INTERNAL MEDICINE

## 2023-07-31 PROCEDURE — G0500 MOD SEDAT ENDO SERVICE >5YRS: HCPCS | Performed by: INTERNAL MEDICINE

## 2023-07-31 PROCEDURE — 88305 TISSUE EXAM BY PATHOLOGIST: CPT | Performed by: INTERNAL MEDICINE

## 2023-08-01 ENCOUNTER — LAB REQUISITION (OUTPATIENT)
Dept: LAB | Facility: HOSPITAL | Age: 58
End: 2023-08-01
Payer: COMMERCIAL

## 2023-08-01 DIAGNOSIS — Z12.11 ENCOUNTER FOR SCREENING FOR MALIGNANT NEOPLASM OF COLON: ICD-10-CM

## 2023-08-02 LAB
CYTO UR: NORMAL
LAB AP CASE REPORT: NORMAL
LAB AP CLINICAL INFORMATION: NORMAL
PATH REPORT.FINAL DX SPEC: NORMAL
PATH REPORT.GROSS SPEC: NORMAL

## 2024-05-20 ENCOUNTER — OFFICE VISIT (OUTPATIENT)
Dept: OBSTETRICS AND GYNECOLOGY | Facility: CLINIC | Age: 59
End: 2024-05-20
Payer: COMMERCIAL

## 2024-05-20 VITALS
BODY MASS INDEX: 31.41 KG/M2 | WEIGHT: 155.8 LBS | SYSTOLIC BLOOD PRESSURE: 140 MMHG | DIASTOLIC BLOOD PRESSURE: 88 MMHG | HEIGHT: 59 IN

## 2024-05-20 DIAGNOSIS — N64.52 DISCHARGE FROM LEFT NIPPLE: ICD-10-CM

## 2024-05-20 DIAGNOSIS — Z01.411 ENCOUNTER FOR GYNECOLOGICAL EXAMINATION WITH ABNORMAL FINDING: Primary | ICD-10-CM

## 2024-05-20 DIAGNOSIS — B00.9 HSV INFECTION: ICD-10-CM

## 2024-05-20 DIAGNOSIS — Z78.0 POSTMENOPAUSAL: ICD-10-CM

## 2024-05-20 DIAGNOSIS — Z13.820 SCREENING FOR OSTEOPOROSIS: ICD-10-CM

## 2024-05-20 PROCEDURE — 99396 PREV VISIT EST AGE 40-64: CPT | Performed by: NURSE PRACTITIONER

## 2024-05-20 RX ORDER — VALACYCLOVIR HYDROCHLORIDE 1 G/1
1000 TABLET, FILM COATED ORAL 2 TIMES DAILY
Qty: 14 TABLET | Refills: 0 | Status: SHIPPED | OUTPATIENT
Start: 2024-05-20 | End: 2024-05-27

## 2024-05-20 NOTE — PROGRESS NOTES
Chief Complaint  Gloria Zhou is a 58 y.o.  female presenting for Annual Exam    History of Present Illness  Gloria is a 59yo woman, , here for annual gyn exam.  Her past surgical history includes, in part, bilateral tubal sterilization, lap RSO, subsequent LAVH/LSO, and bilateral breast augmentation.     She has had no hospitalizations or surgeries in the past year.  No vaginitis episodes or UTIs  Asthma, well controlled  HTN and hyperlipidemia, well controlled  Hypothyroidism, compensated  Moods good with current med  She uses episodic Valtrex for oral fever blisters    She does c/o small amt of yellowish left nipple discharge recently.    Last mammogram 6/15/23, neg  Colonoscopy 2023,  to repeat in 2 yrs    The following portions of the patient's history were reviewed and updated as appropriate: allergies, current medications, past family history, past medical history, past social history, past surgical history, and problem list.    No Known Allergies      Current Outpatient Medications:     valACYclovir (VALTREX) 1000 MG tablet, Take 1 tablet by mouth 2 (Two) Times a Day for 7 days., Disp: 14 tablet, Rfl: 0    albuterol sulfate  (90 Base) MCG/ACT inhaler, , Disp: , Rfl:     atorvastatin (LIPITOR) 20 MG tablet, Take 20 mg by mouth every night at bedtime., Disp: , Rfl:     budesonide-formoterol (SYMBICORT) 80-4.5 MCG/ACT inhaler, , Disp: , Rfl:     Calcium-Magnesium-Vitamin D (CALCIUM 1200+D3 PO), Take 1 tablet by mouth Daily., Disp: , Rfl:     Cholecalciferol 25 MCG (1000 UT) tablet, Take 1 tablet by mouth Daily., Disp: , Rfl:     citalopram (CeleXA) 20 MG tablet, Take 1 tablet by mouth Daily., Disp: , Rfl:     levothyroxine (SYNTHROID, LEVOTHROID) 88 MCG tablet, Take 88 mcg by mouth Daily., Disp: , Rfl:     lisinopril-hydrochlorothiazide (PRINZIDE,ZESTORETIC) 20-25 MG per tablet, Take 1 tablet by mouth Every Morning., Disp: , Rfl:     meloxicam (MOBIC) 15 MG tablet, Take 1 tablet by  "mouth Daily., Disp: , Rfl:     Past Medical History:   Diagnosis Date    Asthma     Depression 2002    HTN 2016    Hyperlipidemia     Hypothyroid 1988    Menopause         Past Surgical History:   Procedure Laterality Date    AUGMENTATION MAMMAPLASTY Bilateral 1998    BREAST AUGMENTATION Bilateral 1998    BREAST AUGMENTATION Bilateral 1999    LAPAROSCOPIC ASSISTED VAGINAL HYSTERECTOMY SALPINGO OOPHORECTOMY Left 2003    [place], History of AUB and ovarian cysts    LAPAROSCOPIC SALPINGOOPHERECTOMY Right 02/2004    [place]    TUBAL ABDOMINAL LIGATION  1987    UVULECTOMY  04/2007       Objective  /88   Ht 149.2 cm (58.75\")   Wt 70.7 kg (155 lb 12.8 oz)   LMP  (LMP Unknown)   Breastfeeding No   BMI 31.74 kg/m²     Physical Exam  Vitals and nursing note reviewed. Exam conducted with a chaperone present.   Constitutional:       General: She is not in acute distress.     Appearance: Normal appearance. She is not ill-appearing.   HENT:      Head: Normocephalic.   Neck:      Thyroid: No thyroid mass or thyromegaly.   Cardiovascular:      Rate and Rhythm: Normal rate and regular rhythm.      Heart sounds: Normal heart sounds. No murmur heard.  Pulmonary:      Effort: Pulmonary effort is normal. No respiratory distress.      Breath sounds: Normal breath sounds.   Chest:   Breasts:     Right: No inverted nipple, mass or nipple discharge.      Left: No inverted nipple, mass or nipple discharge.      Comments: No nipple discharge noted.  No masses or lymphadenopathy.  Abdominal:      Palpations: Abdomen is soft. There is no mass.      Tenderness: There is no abdominal tenderness.   Genitourinary:     General: Normal vulva.      Labia:         Right: No rash, tenderness or lesion.         Left: No rash, tenderness or lesion.       Vagina: No vaginal discharge or erythema.      Uterus: Absent.       Adnexa:         Right: No mass or tenderness.          Left: No mass or tenderness.        Comments: Anus appears wnl.  No " rectal exam performed.  Lymphadenopathy:      Upper Body:      Right upper body: No supraclavicular or axillary adenopathy.      Left upper body: No supraclavicular or axillary adenopathy.   Skin:     General: Skin is warm and dry.   Neurological:      Mental Status: She is alert and oriented to person, place, and time.   Psychiatric:         Mood and Affect: Mood normal.         Behavior: Behavior normal.         Assessment/Plan   Diagnoses and all orders for this visit:    1. Encounter for gynecological examination with abnormal finding (Primary)    2. Discharge from left nipple  -     Mammo Diagnostic Digital Tomosynthesis Left With CAD; Future    3. Postmenopausal  -     DEXA Bone Density Axial; Future    4. Screening for osteoporosis  -     DEXA Bone Density Axial; Future    5. HSV infection    Other orders  -     valACYclovir (VALTREX) 1000 MG tablet; Take 1 tablet by mouth 2 (Two) Times a Day for 7 days.  Dispense: 14 tablet; Refill: 0        Procedures    40 to 64: Counseling/Anticipatory Guidance Discussed: screenings, self-breast exam, and Bone density/Ca/Vit D    Return in about 3 months (around 8/20/2024) for Recheck  (follow up Clinical Breast Exam).    Sheila Phelan, APRN  05/20/2024

## 2024-07-09 ENCOUNTER — HOSPITAL ENCOUNTER (OUTPATIENT)
Dept: BONE DENSITY | Facility: HOSPITAL | Age: 59
Discharge: HOME | End: 2024-07-09
Admitting: NURSE PRACTITIONER
Payer: COMMERCIAL

## 2024-07-09 DIAGNOSIS — Z13.820 SCREENING FOR OSTEOPOROSIS: ICD-10-CM

## 2024-07-09 DIAGNOSIS — Z78.0 POSTMENOPAUSAL: ICD-10-CM

## 2024-07-09 PROCEDURE — 77080 DXA BONE DENSITY AXIAL: CPT

## 2024-07-10 ENCOUNTER — HOSPITAL ENCOUNTER (OUTPATIENT)
Dept: MAMMOGRAPHY | Facility: HOSPITAL | Age: 59
Discharge: HOME | End: 2024-07-10
Payer: COMMERCIAL

## 2024-07-10 ENCOUNTER — HOSPITAL ENCOUNTER (OUTPATIENT)
Dept: ULTRASOUND IMAGING | Facility: HOSPITAL | Age: 59
Discharge: HOME | End: 2024-07-10
Payer: COMMERCIAL

## 2024-07-10 ENCOUNTER — TELEPHONE (OUTPATIENT)
Dept: OBSTETRICS AND GYNECOLOGY | Facility: CLINIC | Age: 59
End: 2024-07-10
Payer: COMMERCIAL

## 2024-07-10 DIAGNOSIS — N64.52 DISCHARGE FROM LEFT NIPPLE: ICD-10-CM

## 2024-07-10 PROCEDURE — G0279 TOMOSYNTHESIS, MAMMO: HCPCS

## 2024-07-10 PROCEDURE — 77066 DX MAMMO INCL CAD BI: CPT

## 2024-07-10 PROCEDURE — 76642 ULTRASOUND BREAST LIMITED: CPT

## 2024-07-23 ENCOUNTER — LAB (OUTPATIENT)
Dept: FAMILY MEDICINE CLINIC | Facility: CLINIC | Age: 59
End: 2024-07-23
Payer: COMMERCIAL

## 2024-07-23 DIAGNOSIS — Z00.00 ROUTINE GENERAL MEDICAL EXAMINATION AT A HEALTH CARE FACILITY: Primary | ICD-10-CM

## 2024-07-23 DIAGNOSIS — R73.9 HYPERGLYCEMIA: ICD-10-CM

## 2024-07-23 DIAGNOSIS — E03.9 HYPOTHYROIDISM, UNSPECIFIED TYPE: ICD-10-CM

## 2024-07-23 DIAGNOSIS — I10 HYPERTENSION, ESSENTIAL: ICD-10-CM

## 2024-07-23 LAB
ALBUMIN SERPL-MCNC: 4.5 G/DL (ref 3.5–5.2)
ALBUMIN/GLOB SERPL: 2 G/DL
ALP SERPL-CCNC: 70 U/L (ref 39–117)
ALT SERPL W P-5'-P-CCNC: 26 U/L (ref 1–33)
ANION GAP SERPL CALCULATED.3IONS-SCNC: 9.1 MMOL/L (ref 5–15)
AST SERPL-CCNC: 29 U/L (ref 1–32)
BILIRUB SERPL-MCNC: 0.5 MG/DL (ref 0–1.2)
BUN SERPL-MCNC: 8 MG/DL (ref 6–20)
BUN/CREAT SERPL: 10 (ref 7–25)
CALCIUM SPEC-SCNC: 10.2 MG/DL (ref 8.6–10.5)
CHLORIDE SERPL-SCNC: 100 MMOL/L (ref 98–107)
CHOLEST SERPL-MCNC: 138 MG/DL (ref 0–200)
CO2 SERPL-SCNC: 27.9 MMOL/L (ref 22–29)
CREAT SERPL-MCNC: 0.8 MG/DL (ref 0.57–1)
EGFRCR SERPLBLD CKD-EPI 2021: 85.5 ML/MIN/1.73
GLOBULIN UR ELPH-MCNC: 2.2 GM/DL
GLUCOSE SERPL-MCNC: 93 MG/DL (ref 65–99)
HDLC SERPL-MCNC: 52 MG/DL (ref 40–60)
LDLC SERPL CALC-MCNC: 68 MG/DL (ref 0–100)
LDLC/HDLC SERPL: 1.29 {RATIO}
POTASSIUM SERPL-SCNC: 4.4 MMOL/L (ref 3.5–5.2)
PROT SERPL-MCNC: 6.7 G/DL (ref 6–8.5)
SODIUM SERPL-SCNC: 137 MMOL/L (ref 136–145)
T4 FREE SERPL-MCNC: 1.37 NG/DL (ref 0.92–1.68)
TRIGL SERPL-MCNC: 95 MG/DL (ref 0–150)
TSH SERPL DL<=0.05 MIU/L-ACNC: 3.66 UIU/ML (ref 0.27–4.2)
VLDLC SERPL-MCNC: 18 MG/DL (ref 5–40)

## 2024-07-23 PROCEDURE — 80050 GENERAL HEALTH PANEL: CPT | Performed by: NURSE PRACTITIONER

## 2024-07-23 PROCEDURE — 83036 HEMOGLOBIN GLYCOSYLATED A1C: CPT | Performed by: NURSE PRACTITIONER

## 2024-07-23 PROCEDURE — 84439 ASSAY OF FREE THYROXINE: CPT | Performed by: NURSE PRACTITIONER

## 2024-07-23 PROCEDURE — 80061 LIPID PANEL: CPT | Performed by: NURSE PRACTITIONER

## 2024-07-23 PROCEDURE — 36415 COLL VENOUS BLD VENIPUNCTURE: CPT | Performed by: FAMILY MEDICINE

## 2024-07-24 LAB
DEPRECATED RDW RBC AUTO: 42.4 FL (ref 37–54)
ERYTHROCYTE [DISTWIDTH] IN BLOOD BY AUTOMATED COUNT: 11.9 % (ref 12.3–15.4)
HBA1C MFR BLD: 5.8 % (ref 4.8–5.6)
HCT VFR BLD AUTO: 46.6 % (ref 34–46.6)
HGB BLD-MCNC: 15.8 G/DL (ref 12–15.9)
MCH RBC QN AUTO: 32.8 PG (ref 26.6–33)
MCHC RBC AUTO-ENTMCNC: 33.9 G/DL (ref 31.5–35.7)
MCV RBC AUTO: 96.9 FL (ref 79–97)
PLATELET # BLD AUTO: 350 10*3/MM3 (ref 140–450)
PMV BLD AUTO: 9.7 FL (ref 6–12)
RBC # BLD AUTO: 4.81 10*6/MM3 (ref 3.77–5.28)
WBC NRBC COR # BLD AUTO: 4.81 10*3/MM3 (ref 3.4–10.8)

## 2024-08-19 ENCOUNTER — OFFICE VISIT (OUTPATIENT)
Dept: OBSTETRICS AND GYNECOLOGY | Facility: CLINIC | Age: 59
End: 2024-08-19
Payer: COMMERCIAL

## 2024-08-19 VITALS
HEIGHT: 59 IN | SYSTOLIC BLOOD PRESSURE: 138 MMHG | BODY MASS INDEX: 31.57 KG/M2 | DIASTOLIC BLOOD PRESSURE: 82 MMHG | WEIGHT: 156.6 LBS

## 2024-08-19 DIAGNOSIS — Z09 FOLLOW-UP EXAM: Primary | ICD-10-CM

## 2024-08-19 DIAGNOSIS — Z87.898 HISTORY OF NIPPLE DISCHARGE: ICD-10-CM

## 2024-08-19 PROCEDURE — 99212 OFFICE O/P EST SF 10 MIN: CPT | Performed by: NURSE PRACTITIONER

## 2024-08-19 NOTE — PROGRESS NOTES
Chief Complaint  Gloria Zhou is a 59 y.o.  female presenting for Breast Problem (3 month follow-up breast exam.  )    History of Present Illness  Gloria is a 58yo woman, , here for a follow up visit (clinical breast exam).  Her surgical hx includes, in part, bilateral breast augmentation.    At last visit (2024) she c/o small amt of yellowish left nipple discharge, which had just started recently.    Her last mammogram (at that time) had been 6/15/23, neg  She has since had a diagnostic mammogram 7/10/24 (BI-RADS 2, benign) and a targeted left breast ultrasound which was also BI-RADS 2, benign findings.  No further nipple discharge noted now.      The following portions of the patient's history were reviewed and updated as appropriate: allergies, current medications, past family history, past medical history, past social history, past surgical history, and problem list.    No Known Allergies      Current Outpatient Medications:     albuterol sulfate  (90 Base) MCG/ACT inhaler, , Disp: , Rfl:     atorvastatin (LIPITOR) 20 MG tablet, Take 20 mg by mouth every night at bedtime., Disp: , Rfl:     budesonide-formoterol (SYMBICORT) 80-4.5 MCG/ACT inhaler, , Disp: , Rfl:     Calcium-Magnesium-Vitamin D (CALCIUM 1200+D3 PO), Take 1 tablet by mouth Daily., Disp: , Rfl:     Cholecalciferol 25 MCG (1000 UT) tablet, Take 1 tablet by mouth Daily., Disp: , Rfl:     citalopram (CeleXA) 20 MG tablet, Take 1 tablet by mouth Daily., Disp: , Rfl:     levothyroxine (SYNTHROID, LEVOTHROID) 88 MCG tablet, Take 88 mcg by mouth Daily., Disp: , Rfl:     lisinopril-hydrochlorothiazide (PRINZIDE,ZESTORETIC) 20-25 MG per tablet, Take 1 tablet by mouth Every Morning., Disp: , Rfl:     meloxicam (MOBIC) 15 MG tablet, Take 1 tablet by mouth Daily., Disp: , Rfl:     Past Medical History:   Diagnosis Date    Asthma     Depression     HTN     Hyperlipidemia     Hypothyroid     Menopause         Past  "Surgical History:   Procedure Laterality Date    AUGMENTATION MAMMAPLASTY Bilateral 1998    BREAST AUGMENTATION Bilateral 1998    BREAST AUGMENTATION Bilateral 1999    LAPAROSCOPIC ASSISTED VAGINAL HYSTERECTOMY SALPINGO OOPHORECTOMY Left 2003    [place], History of AUB and ovarian cysts    LAPAROSCOPIC SALPINGOOPHERECTOMY Right 02/2004    [place]    OOPHORECTOMY      TUBAL ABDOMINAL LIGATION  1987    UVULECTOMY  04/2007       Objective  /82   Ht 149.2 cm (58.75\")   Wt 71 kg (156 lb 9.6 oz)   LMP  (LMP Unknown)   Breastfeeding No   BMI 31.90 kg/m²     Physical Exam  Chest:   Breasts:     Right: Normal. No inverted nipple, mass, nipple discharge, skin change or tenderness.      Left: Normal. No inverted nipple, mass, nipple discharge, skin change or tenderness.      Comments: No nipple discharge.  No masses or lymphadenopathy.  Lymphadenopathy:      Upper Body:      Right upper body: No supraclavicular or axillary adenopathy.      Left upper body: No supraclavicular or axillary adenopathy.         Assessment/Plan   Diagnoses and all orders for this visit:    1. Follow-up exam (Primary)    2. History of nipple discharge        Procedures    40 to 64: Counseling/Anticipatory Guidance Discussed: screenings and self-breast exam    No follow-ups on file.    Sheila Phelan, APRN  08/19/2024  "

## 2024-09-22 PROBLEM — M19.90 OSTEOARTHRITIS: Status: ACTIVE | Noted: 2024-09-22

## 2024-10-07 ENCOUNTER — OFFICE VISIT (OUTPATIENT)
Age: 59
End: 2024-10-07
Payer: COMMERCIAL

## 2024-10-07 VITALS
HEIGHT: 58 IN | WEIGHT: 156 LBS | SYSTOLIC BLOOD PRESSURE: 130 MMHG | TEMPERATURE: 97.4 F | BODY MASS INDEX: 32.75 KG/M2 | DIASTOLIC BLOOD PRESSURE: 82 MMHG | HEART RATE: 82 BPM

## 2024-10-07 DIAGNOSIS — M15.0 PRIMARY OSTEOARTHRITIS INVOLVING MULTIPLE JOINTS: Primary | Chronic | ICD-10-CM

## 2024-10-07 DIAGNOSIS — Z79.1 NSAID LONG-TERM USE: Chronic | ICD-10-CM

## 2024-10-07 PROCEDURE — 99214 OFFICE O/P EST MOD 30 MIN: CPT | Performed by: INTERNAL MEDICINE

## 2024-10-07 RX ORDER — MELOXICAM 15 MG/1
15 TABLET ORAL DAILY PRN
Qty: 90 TABLET | Refills: 1 | Status: SHIPPED | OUTPATIENT
Start: 2024-10-07

## 2024-10-07 NOTE — PROGRESS NOTES
Office Follow Up      Date: 10/07/2024   Patient Name: Glroia Zhou  MRN: [id number]  YOB: 1965    Referring Physician: Kira Hinojosa APRN     Chief Complaint   Patient presents with    Osteoarthritis     Follow up    Follow-up       History of Present Illness: Gloria Zhou is a 59 y.o. female who is here today for follow up.     We have prescribed her meloxicam PRN for joint pain. 8/23/21 autoimmune testing was unrevealing. No recent injuries or infections. No fever.     She rates her pain as 4/10 in severity. She has 1 hour and 30 minutes/day of morning stiffness. No red or hot joints. No swelling today. No muscle pain or weakness. No back or neck problems.     No rash. No lymphadenopathy. No abnormal bruising/bleeding. No headaches. No GI or  issues. No shortness of breath or chest pain. No sicca symptoms.  No lymphadenopathy. No headaches or paresthesias. No hair loss.        Subjective     Review of Systems   Constitutional: Negative.    HENT: Negative.     Eyes: Negative.    Respiratory: Negative.     Cardiovascular: Negative.    Gastrointestinal: Negative.    Endocrine: Negative.    Genitourinary: Negative.    Musculoskeletal: Negative.  Positive for arthralgias.   Skin: Negative.    Allergic/Immunologic: Negative.    Neurological: Negative.    Hematological: Negative.    Psychiatric/Behavioral: Negative.     All other systems reviewed and are negative.         Current Outpatient Medications:     albuterol sulfate  (90 Base) MCG/ACT inhaler, , Disp: , Rfl:     atorvastatin (LIPITOR) 20 MG tablet, Take 1 tablet by mouth every night at bedtime., Disp: , Rfl:     budesonide-formoterol (SYMBICORT) 80-4.5 MCG/ACT inhaler, , Disp: , Rfl:     Calcium-Magnesium-Vitamin D (CALCIUM 1200+D3 PO), Take 1 tablet by mouth Daily., Disp: , Rfl:     citalopram (CeleXA) 20 MG tablet, Take 1 tablet by mouth Daily., Disp: , Rfl:     levothyroxine (SYNTHROID,  "LEVOTHROID) 88 MCG tablet, Take 1 tablet by mouth Daily., Disp: , Rfl:     lisinopril-hydrochlorothiazide (PRINZIDE,ZESTORETIC) 20-25 MG per tablet, Take 1 tablet by mouth Every Morning., Disp: , Rfl:     meloxicam (MOBIC) 15 MG tablet, Take 1 tablet by mouth Daily As Needed for Mild Pain., Disp: 90 tablet, Rfl: 1    valACYclovir (VALTREX) 500 MG tablet, Take 1 tablet by mouth Daily As Needed., Disp: , Rfl:     No Known Allergies    I have reviewed and updated the patient's chief complaint, history of present illness, review of systems, past medical history, surgical history, family history, social history, medications and allergy list as appropriate.     Objective      Vitals:    10/07/24 1127   BP: 130/82   BP Location: Left arm   Patient Position: Sitting   Cuff Size: Adult   Pulse: 82   Temp: 97.4 °F (36.3 °C)   Weight: 70.8 kg (156 lb)   Height: 147.3 cm (58\")   PainSc:   4   PainLoc: Foot     Body mass index is 32.6 kg/m².      Physical Exam     General: Well appearing 59 year old  female. Not in distress. She is ambulating unassisted.   SKIN: No rashes. No alopecia. No subcutaneous nodules. No digital pits or ulcers. No sclerodactyly.   HEENT: NCAT. Conjunctiva clear, no photophobia. No oral or nasal ulcers. Hearing intact.    Pulmonary: Clear to auscultation bilaterally. No wheezing, rales, or rhonchi.  CV: Regular rate and rhythm. No murmurs, rubs, or gallops.   Psych: Normal mood and affect. Alert and oriented x 3.   Extremities: No cyanosis or edema.   Musculoskeletal: No joint swelling.  No warmth or erythema. Normal range of motion of the wrists, ankles, elbows, and knees.    Lymph: No palpable cervical adenopathy      Procedures    Assessment / Plan      Assessment & Plan  Primary osteoarthritis involving multiple joints  * Medications/treatments/interventions tried include: Tylenol, Meloxicam   * 5/17/21 left foot x-rays: Severe 1st MTP osteoarthritis  * Father with RA  * 8/23/21 autoimmune " evaluation was unrevealing    1. Tylenol PRN is ok as directed  2. Continue/refill Meloxicam PRN   3. Weight loss would be helpful  4. Today she rates her pain as 4/10 in severity. She seems stable.   5. We gave her a patient education handout to take home and review regarding osteoarthritis  6. She is to follow-up 6 months  7.  7/23/24 metabolic panel was fine. No labs necessary today.   NSAID long-term use  * Meloxicam 15 mg PO once/day PRN for joint pain relief  * Started 8/23/21   Do not take over-the counter anti-inflammatory medicines, such as ibuprofen or naproxen (Advil, Motrin, Aleve and others), as they may cause problems when combined with your prescription medications.  In general, low dose daily aspirin for the treatment or prevention of heart disease or stroke is safe to take.  Acetaminophen (Tylenol) is generally safe to take as directed for headaches, cramps or other aches and pains or fever reduction.       New Medications Ordered This Visit   Medications    meloxicam (MOBIC) 15 MG tablet     Sig: Take 1 tablet by mouth Daily As Needed for Mild Pain.     Dispense:  90 tablet     Refill:  1         Follow Up:   Return in about 6 months (around 4/7/2025).      Odell Carranza, DO  [facility] [place]

## 2024-10-07 NOTE — ASSESSMENT & PLAN NOTE
* Medications/treatments/interventions tried include: Tylenol, Meloxicam   * 5/17/21 left foot x-rays: Severe 1st MTP osteoarthritis  * Father with RA  * 8/23/21 autoimmune evaluation was unrevealing    1. Tylenol PRN is ok as directed  2. Continue/refill Meloxicam PRN   3. Weight loss would be helpful  4. Today she rates her pain as 4/10 in severity. She seems stable.   5. We gave her a patient education handout to take home and review regarding osteoarthritis  6. She is to follow-up 6 months  7.  7/23/24 metabolic panel was fine. No labs necessary today.   
* Meloxicam 15 mg PO once/day PRN for joint pain relief  * Started 8/23/21   Do not take over-the counter anti-inflammatory medicines, such as ibuprofen or naproxen (Advil, Motrin, Aleve and others), as they may cause problems when combined with your prescription medications.  In general, low dose daily aspirin for the treatment or prevention of heart disease or stroke is safe to take.  Acetaminophen (Tylenol) is generally safe to take as directed for headaches, cramps or other aches and pains or fever reduction.    
Not applicable

## 2025-04-07 ENCOUNTER — OFFICE VISIT (OUTPATIENT)
Age: 60
End: 2025-04-07
Payer: COMMERCIAL

## 2025-04-07 VITALS
DIASTOLIC BLOOD PRESSURE: 70 MMHG | HEIGHT: 58 IN | SYSTOLIC BLOOD PRESSURE: 102 MMHG | TEMPERATURE: 97.4 F | HEART RATE: 83 BPM | WEIGHT: 161 LBS | BODY MASS INDEX: 33.8 KG/M2

## 2025-04-07 DIAGNOSIS — Z72.0 TOBACCO USE: ICD-10-CM

## 2025-04-07 DIAGNOSIS — Z79.1 NSAID LONG-TERM USE: Chronic | ICD-10-CM

## 2025-04-07 DIAGNOSIS — M15.0 PRIMARY OSTEOARTHRITIS INVOLVING MULTIPLE JOINTS: Primary | Chronic | ICD-10-CM

## 2025-04-07 PROCEDURE — 99214 OFFICE O/P EST MOD 30 MIN: CPT | Performed by: INTERNAL MEDICINE

## 2025-04-07 RX ORDER — MELOXICAM 15 MG/1
15 TABLET ORAL DAILY PRN
Qty: 90 TABLET | Refills: 1 | Status: SHIPPED | OUTPATIENT
Start: 2025-04-07

## 2025-04-07 NOTE — ASSESSMENT & PLAN NOTE
* Meloxicam 15 mg PO once/day PRN for joint pain relief  * Started 8/23/21   Do not take over-the counter anti-inflammatory medicines, such as ibuprofen or naproxen (Advil, Motrin, Aleve and others), as they may cause problems when combined with your prescription medications.  In general, low dose daily aspirin for the treatment or prevention of heart disease or stroke is safe to take.  Acetaminophen (Tylenol) is generally safe to take as directed for headaches, cramps or other aches and pains or fever reduction.

## 2025-04-07 NOTE — PROGRESS NOTES
Office Follow Up      Date: 04/07/2025   Patient Name: Gloria Zhou  MRN: [id number]  YOB: 1965    Referring Physician: No ref. provider found     Chief Complaint   Patient presents with    Osteoarthritis     Follow up       History of Present Illness: Gloria Zhou is a 59 y.o. female who is here today for follow up.     We have prescribed her meloxicam PRN for joint pain. 8/23/21 autoimmune testing was unrevealing. No recent injuries or infections. No fever.     She rates her pain as 3/10 in severity. She has 1 hour and 30 minutes/day of morning stiffness. No red or hot joints. No swelling today. No muscle pain or weakness. No back or neck problems.     No rash. No lymphadenopathy. No abnormal bruising/bleeding. No headaches. No GI or  issues. No shortness of breath or chest pain. No sicca symptoms.  No lymphadenopathy. No headaches or paresthesias. No hair loss.        Subjective     Review of Systems   Constitutional: Negative.    HENT: Negative.     Eyes: Negative.    Respiratory: Negative.     Cardiovascular: Negative.    Gastrointestinal: Negative.    Endocrine: Negative.    Genitourinary: Negative.    Musculoskeletal: Negative.  Positive for arthralgias.   Skin: Negative.    Allergic/Immunologic: Negative.    Neurological: Negative.    Hematological: Negative.    Psychiatric/Behavioral: Negative.     All other systems reviewed and are negative.         Current Outpatient Medications:     albuterol sulfate  (90 Base) MCG/ACT inhaler, , Disp: , Rfl:     atorvastatin (LIPITOR) 20 MG tablet, Take 1 tablet by mouth every night at bedtime., Disp: , Rfl:     budesonide-formoterol (SYMBICORT) 80-4.5 MCG/ACT inhaler, , Disp: , Rfl:     Calcium-Magnesium-Vitamin D (CALCIUM 1200+D3 PO), Take 1 tablet by mouth Daily., Disp: , Rfl:     citalopram (CeleXA) 20 MG tablet, Take 1 tablet by mouth Daily., Disp: , Rfl:     levothyroxine (SYNTHROID, LEVOTHROID) 88 MCG  "tablet, Take 1 tablet by mouth Daily., Disp: , Rfl:     lisinopril-hydrochlorothiazide (PRINZIDE,ZESTORETIC) 20-25 MG per tablet, Take 1 tablet by mouth Every Morning., Disp: , Rfl:     meloxicam (MOBIC) 15 MG tablet, Take 1 tablet by mouth Daily As Needed for Mild Pain., Disp: 90 tablet, Rfl: 1    valACYclovir (VALTREX) 500 MG tablet, Take 1 tablet by mouth Daily As Needed., Disp: , Rfl:     No Known Allergies    I have reviewed and updated the patient's chief complaint, history of present illness, review of systems, past medical history, surgical history, family history, social history, medications and allergy list as appropriate.     Objective      Vitals:    04/07/25 1127   BP: 102/70   BP Location: Left arm   Pulse: 83   Temp: 97.4 °F (36.3 °C)   Weight: 73 kg (161 lb)   Height: 147.3 cm (58\")   PainSc: 3      Body mass index is 33.65 kg/m².      Physical Exam     General: Well appearing 59 year old  female. Not in distress. She is ambulating unassisted.   SKIN: No rashes. No alopecia. No subcutaneous nodules. No digital pits or ulcers. No sclerodactyly.   HEENT: NCAT. Conjunctiva clear, no photophobia. No oral or nasal ulcers. Hearing intact.    Pulmonary: Clear to auscultation bilaterally. No wheezing, rales, or rhonchi.  CV: Regular rate and rhythm. No murmurs, rubs, or gallops.   Psych: Normal mood and affect. Alert and oriented x 3.   Extremities: No cyanosis or edema.   Musculoskeletal: No joint swelling.  No warmth or erythema. Normal range of motion of the wrists, ankles, elbows, and knees.    Lymph: No palpable cervical adenopathy      Procedures    Assessment / Plan      Assessment & Plan  Primary osteoarthritis involving multiple joints  * Medications/treatments/interventions tried include: Tylenol, Meloxicam   * 5/17/21 left foot x-rays: Severe 1st MTP osteoarthritis  * Father with RA  * 8/23/21 autoimmune evaluation was unrevealing     1. Tylenol PRN is ok as directed  2. Continue/refill " Meloxicam PRN   3. Weight loss would be helpful  4. Today she rates her pain as 3/10 in severity. She seems stable.   5. We gave her a patient education handout to take home and review regarding arthritis  6. She is to follow-up 6 months  7.  7/23/24 metabolic panel was fine.   NSAID long-term use  * Meloxicam 15 mg PO once/day PRN for joint pain relief  * Started 8/23/21   Do not take over-the counter anti-inflammatory medicines, such as ibuprofen or naproxen (Advil, Motrin, Aleve and others), as they may cause problems when combined with your prescription medications.  In general, low dose daily aspirin for the treatment or prevention of heart disease or stroke is safe to take.  Acetaminophen (Tylenol) is generally safe to take as directed for headaches, cramps or other aches and pains or fever reduction.  Tobacco use  Patient not ready to quit.               Follow Up:   Return in about 6 months (around 10/7/2025).      Odell Carranza DO  [facility]

## 2025-05-29 ENCOUNTER — OFFICE VISIT (OUTPATIENT)
Dept: OBSTETRICS AND GYNECOLOGY | Facility: CLINIC | Age: 60
End: 2025-05-29
Payer: COMMERCIAL

## 2025-05-29 ENCOUNTER — TRANSCRIBE ORDERS (OUTPATIENT)
Dept: ADMINISTRATIVE | Facility: HOSPITAL | Age: 60
End: 2025-05-29
Payer: COMMERCIAL

## 2025-05-29 VITALS
BODY MASS INDEX: 31.77 KG/M2 | SYSTOLIC BLOOD PRESSURE: 110 MMHG | HEIGHT: 59 IN | WEIGHT: 157.6 LBS | DIASTOLIC BLOOD PRESSURE: 76 MMHG

## 2025-05-29 DIAGNOSIS — Z12.31 SCREENING MAMMOGRAM FOR BREAST CANCER: Primary | ICD-10-CM

## 2025-05-29 DIAGNOSIS — Z12.31 ENCOUNTER FOR SCREENING MAMMOGRAM FOR BREAST CANCER: ICD-10-CM

## 2025-05-29 DIAGNOSIS — Z01.419 WOMEN'S ANNUAL ROUTINE GYNECOLOGICAL EXAMINATION: Primary | ICD-10-CM

## 2025-05-29 DIAGNOSIS — Z12.11 COLON CANCER SCREENING: ICD-10-CM

## 2025-05-29 DIAGNOSIS — N95.2 VAGINAL ATROPHY: ICD-10-CM

## 2025-05-29 NOTE — PROGRESS NOTES
"Subjective   Chief Complaint   Patient presents with    Gynecologic Exam     Annual.     Gloria Zhou is a 59 y.o. year old  who is post-menopausal.  She is S/P CHRISTIN, BSO, presenting to be seen for her annual exam.  She is doing well and has no GYN complaints.     This past year she has not been on hormone replacement therapy.  There has not been vaginal bleeding in the last 12 months.  Menopausal symptoms are not present.    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 She will be going on a cruise in the Fall with her family!     SEXUAL Hx:  She is currently sexually active.  In the past year there there has been NO new sexual partners.    Condoms are never used.  She would not like to be screened for STD's at today's exam.  [place] is painful: no  HEALTH Hx:  She exercises regularly: no (but is planning to start exercising more).  She wears her seat belt: yes.  She has concerns about domestic violence: no.  No changes in height     The following portions of the patient's history were reviewed and updated as appropriate:problem list, current medications, allergies, past family history, past medical history, past social history, and past surgical history.    Social History    Tobacco Use      Smoking status: Every Day        Packs/day: 0.25        Years: 0.3 packs/day for 28.9 years (7.2 ttl pk-yrs)        Types: Cigarettes        Start date: 1996      Smokeless tobacco: Never      Tobacco comments: 3 cig/day         Objective   /76 (BP Location: Left arm, Patient Position: Sitting, Cuff Size: Adult)   Ht 147.3 cm (57.99\")   Wt 71.5 kg (157 lb 9.6 oz)   LMP  (LMP Unknown)   Breastfeeding No   BMI 32.95 kg/m²     General:  well developed; well nourished  no acute distress  mentation appropriate  obese - Body mass index is 32.95 kg/m².   Breasts:  Examined in supine position  Symmetric without " masses or skin dimpling  Nipples normal without lesions or discharge; left nipple inverted (baseline for decades since breastfeeding)  There are no palpable axillary nodes  Bilateral implants are noted without obvious palpable abnormalities   Pelvis: Clinical staff was present for exam  External genitalia:  normal appearance of the external genitalia including Bartholin's and Marmet's glands.  :  urethral meatus normal; urethra normal:  Vaginal:  atrophic mucosal changes are present;  Cervix:  absent.  Uterus:  absent.  Adnexa:  absent, bilateral.  Rectal:  digital rectal exam not performed; anus visually normal appearing.        Assessment   Annual GYN exam s/p hysterectomy   Vaginal atrophy   Menopausal female currently not on HRT - without significant symptoms affecting activities of daily living  Need for colonoscopy      Plan   Pap was not done today.  I explained to Gloria that the Pap smears are no longer recommended in patient's after hysterectomy.   I stressed to Gloria that she still should be seen to be seen yearly for a full physical including breast and pelvic exam.   She was encouraged to get yearly mammograms, and order placed today.  She should report any palpable breast lump(s) or skin changes regardless of mammographic findings.  I explained to Gloria that notification regarding her mammogram results will come from the center performing the study.  Our office will not be routinely calling with mammogram results.  It is her responsibility to make sure that the results from the mammogram are communicated to her by the breast center.  If she has any questions about the results, she is welcome to call our office anytime.  Referral placed today to GI for screening colonoscopy.   The importance of keeping all planned follow-up and taking all medications as prescribed was emphasized.  Follow up for annual exam in 1 year or sooner PRN      No orders of the defined types were placed in this encounter.          This note was electronically signed.    Valentina Patrick MD   May 29, 2025

## 2025-05-30 ENCOUNTER — PATIENT ROUNDING (BHMG ONLY) (OUTPATIENT)
Dept: OBSTETRICS AND GYNECOLOGY | Facility: CLINIC | Age: 60
End: 2025-05-30
Payer: COMMERCIAL

## 2025-05-30 NOTE — PROGRESS NOTES
My name is RUDI Zavala    I am with [facility]  [facility]  [facility] [address]  [place] [address] [phone]    I want to officially welcome you to our practice and ask about your recent visit.    Tell me about your visit with us.  What things went well?    We're always looking for ways to make our patients' experiences even better.  Do you have recommendations on ways we may improve?    Overall were you satisfied with your first visit to our practice?    I appreciate you taking the time to answer a few questions today.  Is there anything else I can do for you?    Thank you, and have a great day.

## 2025-06-10 ENCOUNTER — RESULTS FOLLOW-UP (OUTPATIENT)
Facility: HOSPITAL | Age: 60
End: 2025-06-10
Payer: COMMERCIAL

## 2025-06-10 DIAGNOSIS — Z13.79 GENETIC TESTING: ICD-10-CM

## 2025-06-10 DIAGNOSIS — Z80.41 FAMILY HISTORY OF OVARIAN CANCER: Primary | ICD-10-CM

## 2025-06-10 LAB
NCCN CRITERIA FLAG: ABNORMAL
TYRER CUZICK SCORE: 3.3

## 2025-06-26 ENCOUNTER — LAB (OUTPATIENT)
Dept: FAMILY MEDICINE CLINIC | Facility: CLINIC | Age: 60
End: 2025-06-26
Payer: COMMERCIAL

## 2025-06-26 DIAGNOSIS — Z13.79 GENETIC TESTING: ICD-10-CM

## 2025-06-26 DIAGNOSIS — Z80.41 FAMILY HISTORY OF OVARIAN CANCER: ICD-10-CM

## 2025-06-26 PROCEDURE — 36415 COLL VENOUS BLD VENIPUNCTURE: CPT | Performed by: FAMILY MEDICINE

## 2025-07-11 ENCOUNTER — HOSPITAL ENCOUNTER (OUTPATIENT)
Dept: MAMMOGRAPHY | Facility: HOSPITAL | Age: 60
Discharge: HOME | End: 2025-07-11
Admitting: STUDENT IN AN ORGANIZED HEALTH CARE EDUCATION/TRAINING PROGRAM
Payer: COMMERCIAL

## 2025-07-11 DIAGNOSIS — Z12.31 SCREENING MAMMOGRAM FOR BREAST CANCER: ICD-10-CM

## 2025-07-11 PROCEDURE — 77067 SCR MAMMO BI INCL CAD: CPT

## 2025-07-11 PROCEDURE — 77063 BREAST TOMOSYNTHESIS BI: CPT

## 2025-07-18 LAB
AMBRY INTERPRETATION REPORT: NORMAL
GENE DIS ANL INTERP-IMP: NORMAL

## 2025-07-31 ENCOUNTER — TELEPHONE (OUTPATIENT)
Dept: GASTROENTEROLOGY | Facility: CLINIC | Age: 60
End: 2025-07-31
Payer: COMMERCIAL

## 2025-08-07 ENCOUNTER — OUTSIDE FACILITY SERVICE (OUTPATIENT)
Dept: GASTROENTEROLOGY | Facility: CLINIC | Age: 60
End: 2025-08-07
Payer: COMMERCIAL

## 2025-08-07 ENCOUNTER — LAB REQUISITION (OUTPATIENT)
Dept: LAB | Facility: HOSPITAL | Age: 60
End: 2025-08-07
Payer: COMMERCIAL

## 2025-08-07 DIAGNOSIS — Z12.11 ENCOUNTER FOR SCREENING FOR MALIGNANT NEOPLASM OF COLON: ICD-10-CM

## 2025-08-07 PROCEDURE — 88305 TISSUE EXAM BY PATHOLOGIST: CPT | Performed by: INTERNAL MEDICINE

## 2025-08-07 PROCEDURE — 45385 COLONOSCOPY W/LESION REMOVAL: CPT | Performed by: INTERNAL MEDICINE

## 2025-08-07 PROCEDURE — 45380 COLONOSCOPY AND BIOPSY: CPT | Performed by: INTERNAL MEDICINE
